# Patient Record
Sex: FEMALE | Race: OTHER | HISPANIC OR LATINO
[De-identification: names, ages, dates, MRNs, and addresses within clinical notes are randomized per-mention and may not be internally consistent; named-entity substitution may affect disease eponyms.]

---

## 2020-04-05 PROBLEM — Z00.00 ENCOUNTER FOR PREVENTIVE HEALTH EXAMINATION: Status: ACTIVE | Noted: 2020-04-05

## 2020-04-06 ENCOUNTER — APPOINTMENT (OUTPATIENT)
Dept: ANTEPARTUM | Facility: CLINIC | Age: 25
End: 2020-04-06

## 2020-04-08 ENCOUNTER — ASOB RESULT (OUTPATIENT)
Age: 25
End: 2020-04-08

## 2020-04-08 ENCOUNTER — APPOINTMENT (OUTPATIENT)
Dept: ANTEPARTUM | Facility: CLINIC | Age: 25
End: 2020-04-08
Payer: MEDICAID

## 2020-04-08 PROCEDURE — 76805 OB US >/= 14 WKS SNGL FETUS: CPT

## 2020-06-24 ENCOUNTER — ASOB RESULT (OUTPATIENT)
Age: 25
End: 2020-06-24

## 2020-06-24 ENCOUNTER — APPOINTMENT (OUTPATIENT)
Dept: ANTEPARTUM | Facility: CLINIC | Age: 25
End: 2020-06-24
Payer: MEDICAID

## 2020-06-24 PROCEDURE — 76816 OB US FOLLOW-UP PER FETUS: CPT

## 2020-07-29 ENCOUNTER — APPOINTMENT (OUTPATIENT)
Dept: ANTEPARTUM | Facility: CLINIC | Age: 25
End: 2020-07-29
Payer: MEDICAID

## 2020-07-29 ENCOUNTER — ASOB RESULT (OUTPATIENT)
Age: 25
End: 2020-07-29

## 2020-07-29 PROCEDURE — 76816 OB US FOLLOW-UP PER FETUS: CPT

## 2020-08-14 ENCOUNTER — APPOINTMENT (OUTPATIENT)
Dept: ANTEPARTUM | Facility: CLINIC | Age: 25
End: 2020-08-14
Payer: MEDICAID

## 2020-08-14 ENCOUNTER — ASOB RESULT (OUTPATIENT)
Age: 25
End: 2020-08-14

## 2020-08-14 PROCEDURE — 76815 OB US LIMITED FETUS(S): CPT

## 2020-08-31 ENCOUNTER — INPATIENT (INPATIENT)
Facility: HOSPITAL | Age: 25
LOS: 0 days | Discharge: HOME | End: 2020-09-01
Payer: COMMERCIAL

## 2020-08-31 ENCOUNTER — TRANSCRIPTION ENCOUNTER (OUTPATIENT)
Age: 25
End: 2020-08-31

## 2020-08-31 VITALS — DIASTOLIC BLOOD PRESSURE: 76 MMHG | HEART RATE: 96 BPM | SYSTOLIC BLOOD PRESSURE: 122 MMHG | TEMPERATURE: 98 F

## 2020-08-31 DIAGNOSIS — O47.1 FALSE LABOR AT OR AFTER 37 COMPLETED WEEKS OF GESTATION: ICD-10-CM

## 2020-08-31 LAB
ABO RH CONFIRMATION: SIGNIFICANT CHANGE UP
BASOPHILS # BLD AUTO: 0.05 K/UL — SIGNIFICANT CHANGE UP (ref 0–0.2)
BASOPHILS NFR BLD AUTO: 0.4 % — SIGNIFICANT CHANGE UP (ref 0–2)
BLD GP AB SCN SERPL QL: SIGNIFICANT CHANGE UP
EOSINOPHIL # BLD AUTO: 0.03 K/UL — SIGNIFICANT CHANGE UP (ref 0–0.5)
EOSINOPHIL NFR BLD AUTO: 0.2 % — SIGNIFICANT CHANGE UP (ref 0–6)
HCT VFR BLD CALC: 42.6 % — SIGNIFICANT CHANGE UP (ref 34.5–45)
HGB BLD-MCNC: 14.4 G/DL — SIGNIFICANT CHANGE UP (ref 11.5–15.5)
IMM GRANULOCYTES NFR BLD AUTO: 1.1 % — SIGNIFICANT CHANGE UP (ref 0–1.5)
LYMPHOCYTES # BLD AUTO: 14.8 % — SIGNIFICANT CHANGE UP (ref 13–44)
LYMPHOCYTES # BLD AUTO: 2.01 K/UL — SIGNIFICANT CHANGE UP (ref 1–3.3)
MCHC RBC-ENTMCNC: 31 PG — SIGNIFICANT CHANGE UP (ref 27–34)
MCHC RBC-ENTMCNC: 33.8 GM/DL — SIGNIFICANT CHANGE UP (ref 32–36)
MCV RBC AUTO: 91.8 FL — SIGNIFICANT CHANGE UP (ref 80–100)
MONOCYTES # BLD AUTO: 0.69 K/UL — SIGNIFICANT CHANGE UP (ref 0–0.9)
MONOCYTES NFR BLD AUTO: 5.1 % — SIGNIFICANT CHANGE UP (ref 2–14)
NEUTROPHILS # BLD AUTO: 10.67 K/UL — HIGH (ref 1.8–7.4)
NEUTROPHILS NFR BLD AUTO: 78.4 % — HIGH (ref 43–77)
PLATELET # BLD AUTO: 432 K/UL — HIGH (ref 150–400)
RBC # BLD: 4.64 M/UL — SIGNIFICANT CHANGE UP (ref 3.8–5.2)
RBC # FLD: 13.4 % — SIGNIFICANT CHANGE UP (ref 10.3–14.5)
SARS-COV-2 RNA SPEC QL NAA+PROBE: SIGNIFICANT CHANGE UP
WBC # BLD: 13.6 K/UL — HIGH (ref 3.8–10.5)
WBC # FLD AUTO: 13.6 K/UL — HIGH (ref 3.8–10.5)

## 2020-08-31 PROCEDURE — 93970 EXTREMITY STUDY: CPT | Mod: 26

## 2020-08-31 RX ORDER — SODIUM CHLORIDE 9 MG/ML
1000 INJECTION, SOLUTION INTRAVENOUS
Refills: 0 | Status: DISCONTINUED | OUTPATIENT
Start: 2020-08-31 | End: 2020-08-31

## 2020-08-31 RX ORDER — LANOLIN
1 OINTMENT (GRAM) TOPICAL EVERY 6 HOURS
Refills: 0 | Status: DISCONTINUED | OUTPATIENT
Start: 2020-08-31 | End: 2020-09-01

## 2020-08-31 RX ORDER — PRAMOXINE HYDROCHLORIDE 150 MG/15G
1 AEROSOL, FOAM RECTAL EVERY 4 HOURS
Refills: 0 | Status: DISCONTINUED | OUTPATIENT
Start: 2020-08-31 | End: 2020-09-01

## 2020-08-31 RX ORDER — BENZOCAINE 10 %
1 GEL (GRAM) MUCOUS MEMBRANE EVERY 6 HOURS
Refills: 0 | Status: DISCONTINUED | OUTPATIENT
Start: 2020-08-31 | End: 2020-09-01

## 2020-08-31 RX ORDER — DIPHENHYDRAMINE HCL 50 MG
25 CAPSULE ORAL EVERY 6 HOURS
Refills: 0 | Status: DISCONTINUED | OUTPATIENT
Start: 2020-08-31 | End: 2020-09-01

## 2020-08-31 RX ORDER — MAGNESIUM HYDROXIDE 400 MG/1
30 TABLET, CHEWABLE ORAL
Refills: 0 | Status: DISCONTINUED | OUTPATIENT
Start: 2020-08-31 | End: 2020-09-01

## 2020-08-31 RX ORDER — OXYCODONE HYDROCHLORIDE 5 MG/1
5 TABLET ORAL
Refills: 0 | Status: DISCONTINUED | OUTPATIENT
Start: 2020-08-31 | End: 2020-09-01

## 2020-08-31 RX ORDER — OXYTOCIN 10 UNIT/ML
333.33 VIAL (ML) INJECTION
Qty: 20 | Refills: 0 | Status: DISCONTINUED | OUTPATIENT
Start: 2020-08-31 | End: 2020-08-31

## 2020-08-31 RX ORDER — KETOROLAC TROMETHAMINE 30 MG/ML
30 SYRINGE (ML) INJECTION ONCE
Refills: 0 | Status: DISCONTINUED | OUTPATIENT
Start: 2020-08-31 | End: 2020-09-01

## 2020-08-31 RX ORDER — TETANUS TOXOID, REDUCED DIPHTHERIA TOXOID AND ACELLULAR PERTUSSIS VACCINE, ADSORBED 5; 2.5; 8; 8; 2.5 [IU]/.5ML; [IU]/.5ML; UG/.5ML; UG/.5ML; UG/.5ML
0.5 SUSPENSION INTRAMUSCULAR ONCE
Refills: 0 | Status: DISCONTINUED | OUTPATIENT
Start: 2020-08-31 | End: 2020-09-01

## 2020-08-31 RX ORDER — HYDROCORTISONE 1 %
1 OINTMENT (GRAM) TOPICAL EVERY 6 HOURS
Refills: 0 | Status: DISCONTINUED | OUTPATIENT
Start: 2020-08-31 | End: 2020-09-01

## 2020-08-31 RX ORDER — OXYTOCIN 10 UNIT/ML
333.33 VIAL (ML) INJECTION
Qty: 20 | Refills: 0 | Status: DISCONTINUED | OUTPATIENT
Start: 2020-08-31 | End: 2020-09-01

## 2020-08-31 RX ORDER — ACETAMINOPHEN 500 MG
975 TABLET ORAL
Refills: 0 | Status: DISCONTINUED | OUTPATIENT
Start: 2020-08-31 | End: 2020-09-01

## 2020-08-31 RX ORDER — SODIUM CHLORIDE 9 MG/ML
3 INJECTION INTRAMUSCULAR; INTRAVENOUS; SUBCUTANEOUS EVERY 8 HOURS
Refills: 0 | Status: DISCONTINUED | OUTPATIENT
Start: 2020-08-31 | End: 2020-09-01

## 2020-08-31 RX ORDER — DIBUCAINE 1 %
1 OINTMENT (GRAM) RECTAL EVERY 6 HOURS
Refills: 0 | Status: DISCONTINUED | OUTPATIENT
Start: 2020-08-31 | End: 2020-09-01

## 2020-08-31 RX ORDER — IBUPROFEN 200 MG
600 TABLET ORAL EVERY 6 HOURS
Refills: 0 | Status: COMPLETED | OUTPATIENT
Start: 2020-08-31 | End: 2021-07-30

## 2020-08-31 RX ORDER — SIMETHICONE 80 MG/1
80 TABLET, CHEWABLE ORAL EVERY 4 HOURS
Refills: 0 | Status: DISCONTINUED | OUTPATIENT
Start: 2020-08-31 | End: 2020-09-01

## 2020-08-31 RX ORDER — OXYCODONE HYDROCHLORIDE 5 MG/1
5 TABLET ORAL ONCE
Refills: 0 | Status: DISCONTINUED | OUTPATIENT
Start: 2020-08-31 | End: 2020-09-01

## 2020-08-31 RX ORDER — IBUPROFEN 200 MG
600 TABLET ORAL EVERY 6 HOURS
Refills: 0 | Status: DISCONTINUED | OUTPATIENT
Start: 2020-08-31 | End: 2020-09-01

## 2020-08-31 RX ORDER — CITRIC ACID/SODIUM CITRATE 300-500 MG
30 SOLUTION, ORAL ORAL ONCE
Refills: 0 | Status: DISCONTINUED | OUTPATIENT
Start: 2020-08-31 | End: 2020-08-31

## 2020-08-31 RX ORDER — AER TRAVELER 0.5 G/1
1 SOLUTION RECTAL; TOPICAL EVERY 4 HOURS
Refills: 0 | Status: DISCONTINUED | OUTPATIENT
Start: 2020-08-31 | End: 2020-09-01

## 2020-08-31 RX ADMIN — Medication 600 MILLIGRAM(S): at 17:22

## 2020-08-31 RX ADMIN — Medication 1 TABLET(S): at 12:49

## 2020-08-31 RX ADMIN — Medication 975 MILLIGRAM(S): at 15:46

## 2020-08-31 RX ADMIN — SODIUM CHLORIDE 3 MILLILITER(S): 9 INJECTION INTRAMUSCULAR; INTRAVENOUS; SUBCUTANEOUS at 14:26

## 2020-08-31 RX ADMIN — Medication 975 MILLIGRAM(S): at 08:35

## 2020-08-31 RX ADMIN — Medication 975 MILLIGRAM(S): at 15:08

## 2020-08-31 RX ADMIN — Medication 600 MILLIGRAM(S): at 13:25

## 2020-08-31 RX ADMIN — Medication 600 MILLIGRAM(S): at 18:20

## 2020-08-31 RX ADMIN — Medication 600 MILLIGRAM(S): at 12:49

## 2020-08-31 RX ADMIN — Medication 975 MILLIGRAM(S): at 09:22

## 2020-08-31 NOTE — OB PROVIDER H&P - HISTORY OF PRESENT ILLNESS
Sylvia Nieves is a 24 y.o.  at 38 weeks 6 days gestation by LMP who presents to L&D for evaluation of contractions.  She notes having contractions since Saturday, but at midnight they became more intense. She denies vaginal bleeding or loss of fluid. She reports fetal movement.  She reports this pregnancy is uncomplicated.    LMP: 2019  DIANE: 2020    OBHx:  -G1:  at 40 weeks, 7lbs 8oz,   PMH: denies  PSH: denies  Meds: PNV  NKDA    O+  GBS negative

## 2020-08-31 NOTE — DISCHARGE NOTE OB - PLAN OF CARE
Rapid recovery Patient should transition to regular activity level. Resume regular diet. Patient should follow up with her OB for a postpartum checkup 3-6 weeks after delivery. Patient should call her doctor sooner if she develops a fever or uncontrolled vaginal bleeding. Please call sooner if there are any other concerns.

## 2020-08-31 NOTE — OB PROVIDER H&P - NSHPPHYSICALEXAM_GEN_ALL_CORE
Vital Signs  T(C): 36.8 (31 Aug 2020 03:45), Max: 36.8 (31 Aug 2020 03:45)  T(F): 98.2 (31 Aug 2020 03:45), Max: 98.2 (31 Aug 2020 03:45)  HR: 88 (31 Aug 2020 04:40) (88 - 96)  BP: 116/71 (31 Aug 2020 04:40) (116/71 - 122/76)    SVE: 9/100/0  FHT:  [place]:

## 2020-08-31 NOTE — DISCHARGE NOTE OB - PROVIDER TOKENS
FREE:[LAST:[[facility]],PHONE:[phone]],FAX:[phone]],ADDRESS:[OBSTETRICS AND GYNECOLOGY  [address] [place] [address]]]

## 2020-08-31 NOTE — OB RN DELIVERY SUMMARY - NS_SEPSISRSKCALC_OBGYN_ALL_OB_FT
EOS calculated successfully. EOS Risk Factor: 0.5/1000 live births ([facility] national incidence); GA=38w6d; Temp=98.2; ROM=0.083; GBS='Negative'; Antibiotics='No antibiotics or any antibiotics < 2 hrs prior to birth'

## 2020-08-31 NOTE — OB PROVIDER DELIVERY SUMMARY - NSPROVIDERDELIVERYNOTE_OBGYN_ALL_OB_FT
She pushed effectively for 5 minutes, and delivered a viable female infant at 0425. Vertex delivered without difficulty, no nuchal cord noted, both shoulders then delivered without difficulty.  Meconium stained fluid was noted. Delayed cord clamping for approximately 30 seconds, and skin to skin was initiated. Cord segment was clamped and cut for cord blood collection. Placenta delivered spontaneously and intact at 0427. Pitocin started. Uterus, cervix, perineum and vagina were inspected and a periurethral laceration was noted and repaired with a single interrupted stitch of 3.0 chromic . Excellent hemostasis was achieved. Apgars 9,9, EBL 150cc. Patient pushed effectively for 5 minutes, and delivered a viable female infant at 04:25. Vertex delivered without difficulty, no nuchal cord noted, both shoulders then delivered without difficulty.  Meconium stained fluid was noted. Bulb suctioned nose and mouth, +vigorous cry by .  Delayed cord clamping for approximately 30 seconds. Cord segment was clamped and cut for cord blood collection. Placenta delivered spontaneously and intact at 04:27.  Pitocin started. Uterus, cervix, perineum and vagina were inspected and a periurethral laceration was noted and repaired with a single interrupted stitch of 3.0 chromic . Excellent hemostasis was achieved. Apgars 9/9,  EBL 150cc.

## 2020-08-31 NOTE — DISCHARGE NOTE OB - CARE PLAN
Principal Discharge DX:	 (normal spontaneous vaginal delivery)  Goal:	Rapid recovery  Assessment and plan of treatment:	Patient should transition to regular activity level. Resume regular diet. Patient should follow up with her OB for a postpartum checkup 3-6 weeks after delivery. Patient should call her doctor sooner if she develops a fever or uncontrolled vaginal bleeding. Please call sooner if there are any other concerns.

## 2020-08-31 NOTE — OB RN TRIAGE NOTE - NS_TRIAGEADDITIONAL COMMENTS_OBGYN_ALL_OB_FT
pt seen efm toco applied pt c/o pain q 3-5 min denies leakage of fluid and bleeding ob resident sanna aware will be into see pt. call bell given to pt  . pt educated on breathing through ctxs at this time.mark crockett

## 2020-08-31 NOTE — OB PROVIDER H&P - ASSESSMENT
24 y.o.  at 38 weeks 6 days gestation admitted for labor who subsequently had a spontaneous vaginal delivery. Cat 1 FHT. See provider delivery summary for details.    - admission labs  - COVID testing  - routine postpartum care    Discussed with Dr. Dave.

## 2020-08-31 NOTE — DISCHARGE NOTE OB - CARE PROVIDER_API CALL
[facility],   OBSTETRICS AND GYNECOLOGY  [facility] [place] [address]  Phone: [phone]  Fax: [phone]  Follow Up Time:

## 2020-08-31 NOTE — DISCHARGE NOTE OB - PATIENT PORTAL LINK FT
You can access the FollowMyHealth Patient Portal offered by [facility] by registering at the following website: [email or web address]. By joining [email or web address]’s FollowMyHealth portal, you will also be able to view your health information using other applications (apps) compatible with our system.

## 2020-08-31 NOTE — DISCHARGE NOTE OB - MEDICATION SUMMARY - MEDICATIONS TO TAKE
I will START or STAY ON the medications listed below when I get home from the hospital:    acetaminophen 325 mg oral tablet  -- 3 tab(s) by mouth   -- Indication: For pain    ibuprofen 600 mg oral tablet  -- 1 tab(s) by mouth every 6 hours  -- Indication: For pain    Prenatal Multivitamins with Folic Acid 1 mg oral tablet  -- 1 tab(s) by mouth once a day  -- Indication: For postpartum

## 2020-08-31 NOTE — CHART NOTE - NSCHARTNOTEFT_GEN_A_CORE
Nurse alerted this MD to patient having bilateral calf pain.    Patient notes her legs feel "tired", but she has no difficulty walking. She reports her legs feel worse while she's laying down. She denies leg cramps, headache, SOB, chest pain, and palpitations.     Vital Signs   T(C): 36.9 (31 Aug 2020 15:35), Max: 37 (31 Aug 2020 05:29)  T(F): 98.4 (31 Aug 2020 15:35), Max: 98.6 (31 Aug 2020 05:29)  HR: 74 (31 Aug 2020 06:37) (73 - 96)  BP: 124/76 (31 Aug 2020 15:35) (90/61 - 126/76)  RR: 18 (31 Aug 2020 15:35) (18 - 20)  SpO2: 99% (31 Aug 2020 06:37) (99% - 99%)    Gen: NAD  Pulm: CTAB  Card: RRR, no murmurs, rubs or gallops appreciated  Ext: calves appear symmetrical, mild pain with flexion of feet bilaterally, calf pain upon palpitation      24 y.o.  PPD#0 s/p NVSD with bilateral calf pain, low suspicion for DVT since pain is felt bilaterally, but will obtain dopplers to r/o DVT.    -f/u with doppler of extremities  - continue routine postpartum care    Discussed with Dr. Corona. Nurse alerted this MD to patient having bilateral calf pain.    Patient notes her legs feel "tired", but she has no difficulty walking. She reports her legs feel worse while she's laying down. She denies leg cramps, headache, SOB, chest pain, and palpitations.     Vital Signs   T(C): 36.9 (31 Aug 2020 15:35), Max: 37 (31 Aug 2020 05:29)  T(F): 98.4 (31 Aug 2020 15:35), Max: 98.6 (31 Aug 2020 05:29)  HR: 74 (31 Aug 2020 06:37) (73 - 96)  BP: 124/76 (31 Aug 2020 15:35) (90/61 - 126/76)  RR: 18 (31 Aug 2020 15:35) (18 - 20)  SpO2: 99% (31 Aug 2020 06:37) (99% - 99%)    Gen: NAD  Pulm: CTAB  Card: RRR, no murmurs, rubs or gallops appreciated  Ext: calves appear symmetrical, mild pain with flexion of feet bilaterally, calf pain upon palpitation    Preliminary read of bilateral lower extremity venous dopplers:  - negative bilaterally      24 y.o.  PPD#0 s/p NVSD with bilateral calf pain, negative dopplers. Likely to be cramps due to pushing the day before.     - continue routine postpartum care    Discussed with Dr. Corona.

## 2020-08-31 NOTE — OB RN PATIENT PROFILE - NS_ARRIVALFROM_OBGYN_ALL_OB
Patient is bilingual, proficient also in English/Yes-Patient/Caregiver accepts free interpretation services...
Home

## 2020-08-31 NOTE — DISCHARGE NOTE OB - HOSPITAL COURSE
She is a 25 yo now  who presented at 38 weeks 6 days GA in active labor and had a normal delivery. She had a normal postpartum course and was discharged home in stable condition. Upon discharge she was voiding, tolerating PO, and ambulating.

## 2020-09-01 VITALS
OXYGEN SATURATION: 98 % | DIASTOLIC BLOOD PRESSURE: 71 MMHG | TEMPERATURE: 98 F | SYSTOLIC BLOOD PRESSURE: 100 MMHG | HEART RATE: 76 BPM | RESPIRATION RATE: 18 BRPM

## 2020-09-01 LAB — T PALLIDUM AB TITR SER: NEGATIVE — SIGNIFICANT CHANGE UP

## 2020-09-01 PROCEDURE — 59025 FETAL NON-STRESS TEST: CPT

## 2020-09-01 PROCEDURE — 59050 FETAL MONITOR W/REPORT: CPT

## 2020-09-01 PROCEDURE — 85027 COMPLETE CBC AUTOMATED: CPT

## 2020-09-01 PROCEDURE — 36415 COLL VENOUS BLD VENIPUNCTURE: CPT

## 2020-09-01 PROCEDURE — T1013: CPT

## 2020-09-01 PROCEDURE — 87635 SARS-COV-2 COVID-19 AMP PRB: CPT

## 2020-09-01 PROCEDURE — 86900 BLOOD TYPING SEROLOGIC ABO: CPT

## 2020-09-01 PROCEDURE — 93970 EXTREMITY STUDY: CPT

## 2020-09-01 PROCEDURE — G0463: CPT

## 2020-09-01 PROCEDURE — 86901 BLOOD TYPING SEROLOGIC RH(D): CPT

## 2020-09-01 PROCEDURE — 86850 RBC ANTIBODY SCREEN: CPT

## 2020-09-01 PROCEDURE — 86780 TREPONEMA PALLIDUM: CPT

## 2020-09-01 RX ORDER — IBUPROFEN 200 MG
1 TABLET ORAL
Qty: 0 | Refills: 0 | DISCHARGE
Start: 2020-09-01

## 2020-09-01 RX ORDER — ACETAMINOPHEN 500 MG
3 TABLET ORAL
Qty: 0 | Refills: 0 | DISCHARGE
Start: 2020-09-01

## 2020-09-01 RX ADMIN — Medication 975 MILLIGRAM(S): at 10:00

## 2020-09-01 RX ADMIN — Medication 975 MILLIGRAM(S): at 09:12

## 2020-09-01 NOTE — PROGRESS NOTE ADULT - ASSESSMENT
MORIAH MEI is 24y  s/p  PPD#1 Female infant   O+, Rubella immune  Patient with no complaints at this time  VSS    #Postpartum   - Continue routine post-partum care  - Regular diet, advance as tolerated  - Encourage maternal- bonding  - Encourage ambulation  - Dispo: plan for routine discharge protocol per Attending's approval

## 2020-10-15 NOTE — OB RN PATIENT PROFILE - ALCOHOL USE HISTORY SINGLE SELECT
Detail Level: Detailed
Add 00663 Cpt? (Important Note: In 2017 The Use Of 86519 Is Being Tracked By Cms To Determine Future Global Period Reimbursement For Global Periods): no
never

## 2021-11-11 NOTE — OB RN PATIENT PROFILE - BREAST MILK IS MORE DIGESTIBLE, MAKING VOMITING, DIARRHEA, GAS AND CONSTIPATION LESS COMMON
Patient will see you for their 1 month post op visit and after that continue with you for their regular eye care needs.   Statement Selected

## 2023-09-18 NOTE — OB RN TRIAGE NOTE - NS_PAINMANGEPLANS_OBGYN_ALL_OB
Have patient send Evisit for yeast infection. Dear Maria Eugenia Morrow,   Thank you for contacting me. I invite you to take advantage of the [employer] Evisit platform. Compared to a simple [employer] message, the Evisit collects and documents home monitoring readings, current symptoms and possible medication side effects such that it can take the place of an office visit in many cases. This additional information will help me to better address your condition or concern. The maximum cost is similar to the office visit copay for most patients, and many insurers, including Medicare, now cover these visits completely. Also, it can be done at your convenience and does not require any type of scheduling. Below are the steps for beginning and completing an Evisit:      Log into your [employer] account. Under the appointment option, choose \"Begin an Evisit\". If you are on the kala, it will have a stethoscope icon you can tap on. From the menu of Evisits, choose the reason for the visit that best fits your concern or condition. For example, you could select \"Sinus/cold/cough\", \"Urinary symptoms\", \"ADHD follow up\", etc.   Please answer all prompted questions and add comments where permitted. After submitting the Evisit, I will follow up with you on [employer] with a message that will contain a plan for you and follow up instructions. If you try to submit an Evisit and get a message that you must contact your primary care provider, please contact our office. We can assist you in getting the appropriate care for your condition or concern.     Sincerely,  CHELY Colunga Breathing/Relaxation/Epidural
